# Patient Record
Sex: MALE | Race: WHITE | Employment: FULL TIME | URBAN - METROPOLITAN AREA
[De-identification: names, ages, dates, MRNs, and addresses within clinical notes are randomized per-mention and may not be internally consistent; named-entity substitution may affect disease eponyms.]

---

## 2022-07-07 ENCOUNTER — HOSPITAL ENCOUNTER (EMERGENCY)
Age: 62
Discharge: HOME | End: 2022-07-07
Attending: STUDENT IN AN ORGANIZED HEALTH CARE EDUCATION/TRAINING PROGRAM

## 2022-07-07 ENCOUNTER — APPOINTMENT (OUTPATIENT)
Dept: CT IMAGING | Age: 62
End: 2022-07-07

## 2022-07-07 VITALS
HEIGHT: 72 IN | BODY MASS INDEX: 32.51 KG/M2 | SYSTOLIC BLOOD PRESSURE: 148 MMHG | WEIGHT: 240 LBS | DIASTOLIC BLOOD PRESSURE: 90 MMHG | OXYGEN SATURATION: 99 % | RESPIRATION RATE: 16 BRPM | HEART RATE: 76 BPM | TEMPERATURE: 97.7 F

## 2022-07-07 DIAGNOSIS — S09.90XA CLOSED HEAD INJURY, INITIAL ENCOUNTER: ICD-10-CM

## 2022-07-07 DIAGNOSIS — S01.01XA LACERATION OF SCALP, INITIAL ENCOUNTER: Primary | ICD-10-CM

## 2022-07-07 PROCEDURE — 12002 RPR S/N/AX/GEN/TRNK2.6-7.5CM: CPT

## 2022-07-07 PROCEDURE — 99284 EMERGENCY DEPT VISIT MOD MDM: CPT

## 2022-07-07 PROCEDURE — 2500000003 HC RX 250 WO HCPCS: Performed by: NURSE PRACTITIONER

## 2022-07-07 PROCEDURE — 90471 IMMUNIZATION ADMIN: CPT | Performed by: NURSE PRACTITIONER

## 2022-07-07 PROCEDURE — 6360000002 HC RX W HCPCS: Performed by: NURSE PRACTITIONER

## 2022-07-07 PROCEDURE — 70450 CT HEAD/BRAIN W/O DYE: CPT

## 2022-07-07 PROCEDURE — 6370000000 HC RX 637 (ALT 250 FOR IP): Performed by: NURSE PRACTITIONER

## 2022-07-07 PROCEDURE — 90715 TDAP VACCINE 7 YRS/> IM: CPT | Performed by: NURSE PRACTITIONER

## 2022-07-07 RX ORDER — LIDOCAINE HYDROCHLORIDE AND EPINEPHRINE 10; 10 MG/ML; UG/ML
20 INJECTION, SOLUTION INFILTRATION; PERINEURAL ONCE
Status: COMPLETED | OUTPATIENT
Start: 2022-07-07 | End: 2022-07-07

## 2022-07-07 RX ADMIN — TETANUS TOXOID, REDUCED DIPHTHERIA TOXOID AND ACELLULAR PERTUSSIS VACCINE, ADSORBED 0.5 ML: 5; 2.5; 8; 8; 2.5 SUSPENSION INTRAMUSCULAR at 21:09

## 2022-07-07 RX ADMIN — Medication 3 ML: at 21:10

## 2022-07-07 RX ADMIN — LIDOCAINE HYDROCHLORIDE,EPINEPHRINE BITARTRATE 20 ML: 10; .01 INJECTION, SOLUTION INFILTRATION; PERINEURAL at 21:14

## 2022-07-07 ASSESSMENT — PAIN SCALES - GENERAL
PAINLEVEL_OUTOF10: 0
PAINLEVEL_OUTOF10: 3

## 2022-07-07 ASSESSMENT — ENCOUNTER SYMPTOMS
BACK PAIN: 0
COLOR CHANGE: 1

## 2022-07-07 ASSESSMENT — PAIN - FUNCTIONAL ASSESSMENT: PAIN_FUNCTIONAL_ASSESSMENT: 0-10

## 2022-07-07 ASSESSMENT — VISUAL ACUITY: OU: 1

## 2022-07-07 NOTE — Clinical Note
Torres Encarnacion was seen and treated in our emergency department on 7/7/2022. He may return to work on 07/09/2022. If you have any questions or concerns, please don't hesitate to call.       Efrain Ty, APRN - CNP

## 2022-07-08 NOTE — ED NOTES
Pt arrived to ED with a laceration to the top of his head. Pt states he was playing fetch with his dog when he turned and the dog took his legs out. Pt states he hit the mirror on his car. Pt doesn't know if he lost consciousness. Pt states he put a rag on the laceration and went to his sisters house to help him.       Germania West RN  07/07/22 2657

## 2022-07-08 NOTE — ED PROVIDER NOTES
Observation goals PRIOR TO DISCHARGE     Comments: -diagnostic tests and consults completed and resulted   -vital signs normal or at patient baseline: Goal Met  -tolerating oral intake to maintain hydration: Partially Met.   -adequate pain control on oral analgesics: Partially Met. Dilaudid takes care of pain right away but sill has pain with breathing.   -returns to baseline functional status: Goal Not Met. Painful with turning/repositioning. Hasn't been OOB yet.    -safe disposition plan has been identified: Goal Not Met. Unsure of plan. Home vs TCU.   Nurse to notify provider when observation goals have been met and patient is ready for discharge.           [facility] ED  EMERGENCY DEPARTMENT ENCOUNTER      Pt Name: Jackie Rodriguez  MRN: [id number]  [place] 1960  Date of evaluation: 7/7/2022  Provider: SAURAV Tierney CNP    CHIEF COMPLAINT       Chief Complaint   Patient presents with    Head Injury    Laceration         HISTORY OFPRESENT ILLNESS  (Location/Symptom, Timing/Onset, Context/Setting, Quality, Duration, Modifying Factors, Severity.)   Torres Encarnacion is a 62 y.o. male who presents to the emergency department by private auto for evaluation of head injury and scalp laceration. Patient was brought to the ER by his sister. Patient states he was mowing his lawn today and tripped over his dog and fell striking top of his head on the car door mirror. She fell to the ground. Unsure if he lost consciousness or hit his head on the ground. Has a mild headache upon arrival.  He has been drinking alcohol this evening. Unsure of his last tetanus. Nursing Notes were reviewed. PASTMEDICAL HISTORY   History reviewed. No pertinent past medical history. SURGICAL HISTORY     History reviewed. No pertinent surgical history. CURRENT MEDICATIONS     Previous Medications    ALPRAZOLAM (XANAX) 0.25 MG TABLET    Take 1 tablet by mouth 3 times daily as needed for Anxiety       ALLERGIES     Patient has no known allergies. FAMILY HISTORY     History reviewed. No pertinent family history. SOCIAL HISTORY       Social History     Socioeconomic History    Marital status: Single     Spouse name: None    Number of children: None    Years of education: None    Highest education level: None   Occupational History    None   Tobacco Use    Smoking status: Current Every Day Smoker    Smokeless tobacco: Never Used   Substance and Sexual Activity    Alcohol use:  Yes    Drug use: No    Sexual activity: None   Other Topics Concern    None   Social History Narrative    None     Social Determinants of Health     Financial Resource Strain: 97.7 °F (36.5 °C) Oral 76 16 99 % 6' (1.829 m) 240 lb (108.9 kg)       Physical Exam  Constitutional:       Appearance: Normal appearance. He is well-developed, well-groomed and normal weight. HENT:      Head: Normocephalic. Laceration present. Comments: 3 cm laceration noted to the top of the scalp. Minimal bleeding present. Right Ear: External ear normal.      Left Ear: External ear normal.      Nose: Nose normal.      Mouth/Throat:      Mouth: Mucous membranes are moist.   Eyes:      General: Lids are normal. Vision grossly intact. Extraocular Movements: Extraocular movements intact. Conjunctiva/sclera: Conjunctivae normal.      Pupils: Pupils are equal, round, and reactive to light. Pulmonary:      Effort: Pulmonary effort is normal. No respiratory distress. Musculoskeletal:         General: Normal range of motion. Cervical back: Full passive range of motion without pain, normal range of motion and neck supple. No pain with movement, spinous process tenderness or muscular tenderness. Skin:     General: Skin is warm and dry. Findings: Laceration present. Neurological:      Mental Status: He is alert and oriented to person, place, and time. GCS: GCS eye subscore is 4. GCS verbal subscore is 5. GCS motor subscore is 6. Cranial Nerves: Cranial nerves are intact. Sensory: Sensation is intact. Motor: Motor function is intact. Psychiatric:         Mood and Affect: Mood normal.         Behavior: Behavior normal.         Thought Content:  Thought content normal.         Judgment: Judgment normal.           DIAGNOSTIC RESULTS     EKG:All EKG's are interpreted by the Emergency Department Physician who either signs or Co-signs this chart in the absence of a cardiologist.        RADIOLOGY:   Non-plain film images such as CT, Ultrasound and MRI are read by theradiologist. Plain radiographic images are visualized and preliminarily interpreted by the emergency physician with the below findings:    CT HEAD WO CONTRAST    Result Date: 7/7/2022  EXAMINATION: CT OF THE HEAD WITHOUT CONTRAST  7/7/2022 8:58 pm TECHNIQUE: CT of the head was performed without the administration of intravenous contrast. Automated exposure control, iterative reconstruction, and/or weight based adjustment of the mA/kV was utilized to reduce the radiation dose to as low as reasonably achievable. COMPARISON: None. HISTORY: ORDERING SYSTEM PROVIDED HISTORY: Fall hit head on car mirror causing laceration to his scalp TECHNOLOGIST PROVIDED HISTORY: Fall hit head on car mirror causing laceration to his scalp Decision Support Exception - unselect if not a suspected or confirmed emergency medical condition->Emergency Medical Condition (MA) FINDINGS: BRAIN/VENTRICLES: There is no acute intracranial hemorrhage, mass effect or midline shift. No abnormal extra-axial fluid collection. The gray-white differentiation is maintained without evidence of an acute infarct. There is no evidence of hydrocephalus. ORBITS: The visualized portion of the orbits demonstrate no acute abnormality. SINUSES: The visualized paranasal sinuses and mastoid air cells demonstrate no acute abnormality. SOFT TISSUES/SKULL:  No acute abnormality of the visualized skull or soft tissues. No acute intracranial abnormality. No acute territorial infarction, intracranial hemorrhage or mass lesion. Interpretation per the Radiologist below, if available at the time of this note:    CT HEAD WO CONTRAST   Final Result   No acute intracranial abnormality. No acute territorial infarction,   intracranial hemorrhage or mass lesion. EDBEDSIDE ULTRASOUND:   Performed by Vinay Carvalho - none    LABS:  Labs Reviewed - No data to display    All other labs were within normal range or not returned as of this dictation. EMERGENCY DEPARTMENT COURSE andDIFFERENTIAL DIAGNOSIS/MDM:   Tetanus updated. Laceration repaired.   See procedure note.  CT head no acute acute abnormality. No neurological deficits. Patient discharged home with his sister. Instructed return to the ED in 6 days for suture removal.    Vitals:    Vitals:    07/07/22 2033 07/07/22 2034   BP:  (!) 148/90   Pulse: 76    Resp: 16    Temp:  97.7 °F (36.5 °C)   TempSrc:  Oral   SpO2: 99%    Weight: 240 lb (108.9 kg)    Height: 6' (1.829 m)          CONSULTS:  None    RES:  Lac Repair    Date/Time: 7/7/2022 10:02 PM  Performed by: SAURAV Serra - CNP  Authorized by: Karsten Boykin DO     Consent:     Consent obtained:  Verbal    Consent given by:  Patient    Risks discussed:  Infection, pain and poor cosmetic result  Anesthesia (see MAR for exact dosages): Anesthesia method:  Local infiltration    Local anesthetic:  Lidocaine 1% WITH epi  Laceration details:     Location:  Scalp    Scalp location:  Crown    Length (cm):  3  Repair type:     Repair type: Intermediate  Pre-procedure details:     Preparation:  Patient was prepped and draped in usual sterile fashion and imaging obtained to evaluate for foreign bodies  Exploration:     Wound exploration: wound explored through full range of motion and entire depth of wound probed and visualized      Wound extent: no nerve damage noted, no tendon damage noted and no underlying fracture noted      Contaminated: no    Treatment:     Area cleansed with:  Betadine    Irrigation solution:  Sterile saline    Irrigation method:  Pressure wash    Visualized foreign bodies/material removed: no    Skin repair:     Repair method:  Sutures    Suture size:  5-0    Suture material:  Nylon    Suture technique:  Simple interrupted    Number of sutures:  7  Approximation:     Approximation:  Close  Post-procedure details:     Dressing:  Open (no dressing)    Patient tolerance of procedure: Tolerated well, no immediate complications        FINAL IMPRESSION      1. Laceration of scalp, initial encounter    2.  Closed head injury, initial encounter DISPOSITION/PLAN   DISPOSITION Decision To Discharge 07/07/2022 10:15:12 PM      PATIENT REFERRED TO:   [facility] ED  [address]  [phone]    Return to the emergency department in 6 to 8 days for suture removal.      DISCHARGE MEDICATIONS:     New Prescriptions    No medications on file     Electronically signed by SAURAV Serra 7/7/2022 at 10:18 PM            SAURAV Serra CNP  07/07/22 3295

## 2022-07-15 ENCOUNTER — HOSPITAL ENCOUNTER (EMERGENCY)
Age: 62
Discharge: HOME | End: 2022-07-15
Attending: EMERGENCY MEDICINE

## 2022-07-15 VITALS
BODY MASS INDEX: 31.15 KG/M2 | DIASTOLIC BLOOD PRESSURE: 98 MMHG | RESPIRATION RATE: 16 BRPM | HEIGHT: 72 IN | HEART RATE: 92 BPM | WEIGHT: 230 LBS | SYSTOLIC BLOOD PRESSURE: 167 MMHG | TEMPERATURE: 98.2 F | OXYGEN SATURATION: 96 %

## 2022-07-15 DIAGNOSIS — Z48.02 VISIT FOR SUTURE REMOVAL: Primary | ICD-10-CM

## 2022-07-15 PROCEDURE — 99282 EMERGENCY DEPT VISIT SF MDM: CPT

## 2022-07-15 ASSESSMENT — ENCOUNTER SYMPTOMS
EYES NEGATIVE: 1
ALLERGIC/IMMUNOLOGIC NEGATIVE: 1
GASTROINTESTINAL NEGATIVE: 1
RESPIRATORY NEGATIVE: 1

## 2022-07-15 ASSESSMENT — PAIN - FUNCTIONAL ASSESSMENT: PAIN_FUNCTIONAL_ASSESSMENT: NONE - DENIES PAIN

## 2022-07-15 NOTE — ED PROVIDER NOTES
[facility]0 [facility] ED  [facility]      Pt Name: Lesa Green  MRN: [id number]  [place] 1960  Date ofevaluation: 7/15/2022  Provider: Jaqueline Moreland PA-C    CHIEF COMPLAINT       Chief Complaint   Patient presents with    Suture / Staple Removal         HISTORY OF PRESENT ILLNESS  (Location/Symptom, Timing/Onset, Context/Setting, Quality, Duration, Modifying Factors, Severity.)   Brendan Encarnacion is a 62 y.o. male who presents to the emergency department with removal to the forehead and scalp area. Patient denies any trouble or concerns. No redness, drainage, fevers, concerns for infection. Patient's only reason for being here today is to have the sutures removed. Nursing Notes were reviewed. ALLERGIES     Patient has no known allergies. CURRENT MEDICATIONS       Previous Medications    ALPRAZOLAM (XANAX) 0.25 MG TABLET    Take 1 tablet by mouth 3 times daily as needed for Anxiety       PAST MEDICAL HISTORY   History reviewed. No pertinent past medical history. SURGICAL HISTORY     History reviewed. No pertinent surgical history. FAMILY HISTORY     History reviewed. No pertinent family history. No family status information on file. SOCIAL HISTORY      reports that he has been smoking. He has never used smokeless tobacco. He reports current alcohol use. He reports that he does not use drugs. REVIEW OFSYSTEMS    (2-9 systems for level 4, 10 or more for level 5)   Review of Systems   Constitutional: Negative. Eyes: Negative. Respiratory: Negative. Cardiovascular: Negative. Gastrointestinal: Negative. Endocrine: Negative. Genitourinary: Negative. Musculoskeletal: Negative. Skin: Negative. Allergic/Immunologic: Negative. Neurological: Negative. Hematological: Negative. Psychiatric/Behavioral: Negative. All other systems reviewed and are negative.     Except as noted above the remainder of the review of systems was reviewed and negative. PHYSICAL EXAM    (up to 7 for level 4, 8 or more for level 5)     ED Triage Vitals [07/15/22 1823]   BP Temp Temp Source Heart Rate Resp SpO2 Height Weight   (!) 167/98 98.2 °F (36.8 °C) Oral 92 16 96 % 6' (1.829 m) 230 lb (104.3 kg)      Physical Exam  Constitutional:       Appearance: He is well-developed. HENT:      Head: Normocephalic and atraumatic. Cardiovascular:      Rate and Rhythm: Normal rate and regular rhythm. Pulmonary:      Effort: Pulmonary effort is normal.      Breath sounds: Normal breath sounds. Abdominal:      Palpations: Abdomen is soft. Musculoskeletal:         General: Normal range of motion. Cervical back: Normal range of motion and neck supple. Skin:     General: Skin is warm. Neurological:      Mental Status: He is alert and oriented to person, place, and time. Psychiatric:         Behavior: Behavior normal.               DIAGNOSTIC RESULTS     EKG: All EKG's are interpreted by the Emergency Department Physician who either signs or Co-signs this chart in the absence of a cardiologist.        RADIOLOGY:   Non-plain film images such as CT, Ultrasound and MRI are read by the radiologist. Plain radiographic images arevisualized and preliminarily interpreted by the emergency physician with the below findings:        Interpretation per the Radiologist below, if available at thetime of this note:          ED BEDSIDE ULTRASOUND:   Performed by ED Physician - none    LABS:  Labs Reviewed - No data to display    All other labs were within normal range or not returned as of this dictation. EMERGENCY DEPARTMENT COURSE and DIFFERENTIAL DIAGNOSIS/MDM:   Vitals:    Vitals:    07/15/22 1823   BP: (!) 167/98   Pulse: 92   Resp: 16   Temp: 98.2 °F (36.8 °C)   TempSrc: Oral   SpO2: 96%   Weight: 230 lb (104.3 kg)   Height: 6' (1.829 m)     Sutures removed without difficulty.       CONSULTS:  None    PROCEDURES:  Procedures        FINAL IMPRESSION 1. Visit for suture removal          DISPOSITION/PLAN   DISPOSITION Decision To Discharge 07/15/2022 06:37:44 PM      PATIENTREFERRED TO:   No follow-up provider specified.     DISCHARGE MEDICATIONS:     New Prescriptions    No medications on file           (Please note that portions of this note were completed with a voice recognition program.  Efforts were made to edit thedictations but occasionally words are mis-transcribed.)    STELLA Marshall PA-C  07/15/22 Kennedi Alfonso

## 2022-07-15 NOTE — ED NOTES
Pt here for suture removal.  Sutures are well approximated and no s/s of infection     Siri Mcgee RN  07/15/22 1822

## 2022-07-15 NOTE — DISCHARGE INSTRUCTIONS
Take meds as prescribed. Follow outpatient tomorrow with doctor or by calling [phone] or [phone].    Return to ER immediately if symptoms worsen or persist.

## 2022-07-15 NOTE — ED PROVIDER NOTES
eMERGENCY dEPARTMENT eNCOUnter   Independent Attestation     Pt Name: Obinna Dear  MRN: [id number]  [place] 1960  Date of evaluation: 7/15/22     Edsadaf Encarnacion is a 62 y.o. male with CC: Suture / Staple Removal        This visit was performed by both a physician and an APC. I performed all aspects of the MDM as documented.       The care is provided during an unprecedented national emergency due to the novel coronavirus, Edi Mathis MD  Attending Emergency Physician           Madina Tse MD  07/15/22 8823

## 2022-11-30 ENCOUNTER — HOSPITAL ENCOUNTER (EMERGENCY)
Age: 62
Discharge: HOME | End: 2022-11-30
Attending: EMERGENCY MEDICINE

## 2022-11-30 ENCOUNTER — APPOINTMENT (OUTPATIENT)
Dept: GENERAL RADIOLOGY | Age: 62
End: 2022-11-30

## 2022-11-30 VITALS
RESPIRATION RATE: 24 BRPM | HEIGHT: 72 IN | DIASTOLIC BLOOD PRESSURE: 73 MMHG | BODY MASS INDEX: 32.51 KG/M2 | SYSTOLIC BLOOD PRESSURE: 138 MMHG | OXYGEN SATURATION: 96 % | HEART RATE: 103 BPM | WEIGHT: 240 LBS | TEMPERATURE: 98.3 F

## 2022-11-30 DIAGNOSIS — J11.1 INFLUENZA WITH RESPIRATORY MANIFESTATION OTHER THAN PNEUMONIA: Primary | ICD-10-CM

## 2022-11-30 LAB
INFLUENZA A: DETECTED
INFLUENZA B: NOT DETECTED
SARS-COV-2 RNA, RT PCR: NOT DETECTED
SOURCE: ABNORMAL
SPECIMEN DESCRIPTION: ABNORMAL

## 2022-11-30 PROCEDURE — 99284 EMERGENCY DEPT VISIT MOD MDM: CPT

## 2022-11-30 PROCEDURE — 6370000000 HC RX 637 (ALT 250 FOR IP): Performed by: STUDENT IN AN ORGANIZED HEALTH CARE EDUCATION/TRAINING PROGRAM

## 2022-11-30 PROCEDURE — 71045 X-RAY EXAM CHEST 1 VIEW: CPT

## 2022-11-30 PROCEDURE — 87636 SARSCOV2 & INF A&B AMP PRB: CPT

## 2022-11-30 RX ORDER — PREDNISONE 50 MG/1
50 TABLET ORAL DAILY
Qty: 4 TABLET | Refills: 0 | Status: SHIPPED | OUTPATIENT
Start: 2022-11-30 | End: 2022-12-04

## 2022-11-30 RX ORDER — ACETAMINOPHEN 500 MG
1000 TABLET ORAL ONCE
Status: COMPLETED | OUTPATIENT
Start: 2022-11-30 | End: 2022-11-30

## 2022-11-30 RX ORDER — OSELTAMIVIR PHOSPHATE 75 MG/1
75 CAPSULE ORAL 2 TIMES DAILY
Qty: 10 CAPSULE | Refills: 0 | Status: SHIPPED | OUTPATIENT
Start: 2022-11-30 | End: 2022-12-05

## 2022-11-30 RX ORDER — OSELTAMIVIR PHOSPHATE 75 MG/1
75 CAPSULE ORAL ONCE
Status: COMPLETED | OUTPATIENT
Start: 2022-11-30 | End: 2022-11-30

## 2022-11-30 RX ORDER — ALBUTEROL SULFATE 90 UG/1
2 AEROSOL, METERED RESPIRATORY (INHALATION) ONCE
Status: COMPLETED | OUTPATIENT
Start: 2022-11-30 | End: 2022-11-30

## 2022-11-30 RX ADMIN — PREDNISONE 50 MG: 20 TABLET ORAL at 22:46

## 2022-11-30 RX ADMIN — ACETAMINOPHEN 1000 MG: 500 TABLET ORAL at 22:45

## 2022-11-30 RX ADMIN — ALBUTEROL SULFATE 2 PUFF: 90 AEROSOL, METERED RESPIRATORY (INHALATION) at 22:47

## 2022-11-30 RX ADMIN — OSELTAMIVIR 75 MG: 75 CAPSULE ORAL at 22:45

## 2022-11-30 ASSESSMENT — ENCOUNTER SYMPTOMS
SINUS PRESSURE: 0
CONSTIPATION: 0
EYE PAIN: 0
VOMITING: 0
ABDOMINAL PAIN: 0
RHINORRHEA: 1
DIARRHEA: 0
COLOR CHANGE: 0
NAUSEA: 0
PHOTOPHOBIA: 0
CHEST TIGHTNESS: 0
EYE REDNESS: 0
SORE THROAT: 0
COUGH: 1
SHORTNESS OF BREATH: 1

## 2022-11-30 ASSESSMENT — PAIN - FUNCTIONAL ASSESSMENT: PAIN_FUNCTIONAL_ASSESSMENT: NONE - DENIES PAIN

## 2022-11-30 ASSESSMENT — LIFESTYLE VARIABLES
HOW OFTEN DO YOU HAVE A DRINK CONTAINING ALCOHOL: MONTHLY OR LESS
HOW MANY STANDARD DRINKS CONTAINING ALCOHOL DO YOU HAVE ON A TYPICAL DAY: 1 OR 2

## 2022-11-30 NOTE — Clinical Note
Weston Alcantara was seen and treated in our emergency department on 11/30/2022. He may return to work on 12/02/2022. If you have any questions or concerns, please don't hesitate to call.       Karen Kimble MD

## 2022-12-01 NOTE — ED PROVIDER NOTES
[address] ED  eMERGENCY dEPARTMENT eNCOUnter   Attending Attestation     Pt Name: Jennifer Schmitz  MRN: [id number]  [place] 1960  Date of evaluation: 11/30/22    History, EXAM, MDM:    Jennifer Schmitz is a 65 y.o. male who presents with Shortness of Breath (Pt to ED for progressive SOB since Sunday without CP/Pt states \"something isn't right\" /Increased WOB observed/Pt denies any pertinent medical hx or sick contacts//Pt states he started a new job about a month ago which has led him to be a little more sedentary)  Cough, congestion, sob since Sunday. Former smoker. No CP. No leg edema. On exam, normotensive, tachycardia on presentation. Had some bilateral exp wheezing on exam.  No calf ttp. CXR shows no pna, no sign of chf. Influenza A test is positive. Pt started on tamiflu and prednisone. Recommended close follow up with pcp, return to ED if symptoms worsen, and warning precautions provided. Vitals:   Vitals:    11/30/22 2110   BP: 138/73   Pulse: (!) 103   Resp: 24   Temp: 98.3 °F (36.8 °C)   TempSrc: Oral   SpO2: 96%   Weight: 240 lb (108.9 kg)   Height: 6' (1.829 m)     I performed a history and physical examination of the patient and discussed management with the resident. I reviewed the residents note and agree with the documented findings and plan of care. Any areas of disagreement are noted on the chart. I was personally present for the key portions of any procedures. I have documented in the chart those procedures where I was not present during the key portions. I have personally reviewed all images and agree with the resident's interpretation. I have reviewed the emergency nurses triage note. I agree with the chief complaint, past medical history, past surgical history, allergies, medications, social and family history as documented unless otherwise noted below.  Documentation of the HPI, Physical Exam and Medical Decision Making performed by medical students or scribes is based on my personal performance of the HPI, PE and MDM. For Phys Assistant/ Nurse Practitioner cases/documentation I have had a face to face evaluation of this patient and have completed at least one if not all key elements of the E/M (history, physical exam, and MDM). Additional findings are as noted.     Tamia Ignacio MD  Attending Emergency  Physician                Tamia Ignacio MD  12/01/22 0334       Tamia Ignacio MD  12/01/22 0137

## 2022-12-01 NOTE — ED PROVIDER NOTES
[address]  Emergency Department Encounter  EmergencyMedicine Resident     Pt Name:Brendan Mosher  MRN: [id number]  Birthdate 1960  Date of evaluation: 11/30/22  PCP:  No primary care provider on file. CHIEF COMPLAINT       Chief Complaint   Patient presents with    Shortness of Breath     Pt to ED for progressive SOB since Sunday without CP  Pt states \"something isn't right\"   Increased WOB observed  Pt denies any pertinent medical hx or sick contacts    Pt states he started a new job about a month ago which has led him to be a little more sedentary       HISTORY OF PRESENT ILLNESS  (Location/Symptom, Timing/Onset, Context/Setting, Quality, Duration, Modifying Factors, Severity.)      Brendan Encarnacion is a 65 y.o. male who presents with dry cough, nasal congestion and runny nose, myalgias and chills since Sunday. Was well prior without issues with SOB. No orthopnea. No medical history, takes no medications. States he feels like he has something to cough up but nothing produces. Clear nasal drainage. Subjective fevers. No chest pain or nausea or vomiting or diaphoresis. PAST MEDICAL / SURGICAL / SOCIAL / FAMILY HISTORY      has no past medical history on file. reviewed with patient and none reported. has no past surgical history on file. reviewed with patient and none reported.      Social History     Socioeconomic History    Marital status: Single     Spouse name: Not on file    Number of children: Not on file    Years of education: Not on file    Highest education level: Not on file   Occupational History    Not on file   Tobacco Use    Smoking status: Every Day    Smokeless tobacco: Never   Substance and Sexual Activity    Alcohol use: Yes    Drug use: No    Sexual activity: Not on file   Other Topics Concern    Not on file   Social History Narrative    Not on file     Social Determinants of Health     Financial Resource Strain: Not on file   Food Insecurity: Not on file Transportation Needs: Not on file   Physical Activity: Not on file   Stress: Not on file   Social Connections: Not on file   Intimate Partner Violence: Not on file   Housing Stability: Not on file       No family history on file. Allergies:  Patient has no known allergies. Home Medications:  Prior to Admission medications    Medication Sig Start Date End Date Taking? Authorizing Provider   oseltamivir (TAMIFLU) 75 MG capsule Take 1 capsule by mouth 2 times daily for 5 days 11/30/22 12/5/22 Yes Storm Castañeda MD   predniSONE (DELTASONE) 50 MG tablet Take 1 tablet by mouth daily for 4 days 11/30/22 12/4/22 Yes Storm Castañeda MD   ALPRAZolam [facility]) 0.25 MG tablet Take 1 tablet by mouth 3 times daily as needed for Anxiety 8/11/15   Rudy Moore MD       REVIEW OF SYSTEMS    (2-9 systems for level 4, 10 or more for level 5)      Review of Systems   Constitutional:  Positive for chills. Negative for activity change, diaphoresis, fatigue and fever. HENT:  Positive for congestion and rhinorrhea. Negative for sinus pressure and sore throat. Eyes:  Negative for photophobia, pain and redness. Respiratory:  Positive for cough and shortness of breath. Negative for chest tightness. Cardiovascular:  Negative for chest pain and leg swelling. Gastrointestinal:  Negative for abdominal pain, constipation, diarrhea, nausea and vomiting. Genitourinary:  Negative for dysuria, flank pain, frequency and urgency. Musculoskeletal:  Negative for arthralgias, myalgias and neck stiffness. Skin:  Negative for color change, pallor and rash. Neurological:  Negative for dizziness, syncope, weakness, light-headedness, numbness and headaches.        PHYSICAL EXAM   (up to 7 for level 4, 8 or more for level 5)      INITIAL VITALS:   /73   Pulse (!) 103   Temp 98.3 °F (36.8 °C) (Oral)   Resp 24   Ht 6' (1.829 m)   Wt 240 lb (108.9 kg)   SpO2 96%   BMI 32.55 kg/m²     Physical Exam  Constitutional:  Well developed, Well nourished. HENT:  Normocephalic, Atraumatic, Bilateral external ears normal,  Nose normal.   Neck: Normal range of motion, No stridor. Eyes:   No discharge. Respiratory:   No respiratory distress, bilateral wheezing more on the right, left lower, rales or rhonchi. Cardiovascular: Normal S1, S2. No rubs, gallops or murmurs. Gastrointestinal:  No organomegaly, no tenderness, no rebound or guarding. Musculoskeletal:  No extremity deformity. Lymphatic: No lymphadenopathy noted  Skin:  Warm, Dry,  No rash. Neurologic:  Alert & oriented x 3, Normal motor function,  No focal deficits noted. Psychiatric:  Affect normal, Judgment normal, Mood normal.              DIFFERENTIAL  DIAGNOSIS     PLAN (LABS / IMAGING / EKG):  Orders Placed This Encounter   Procedures    COVID-19 & Influenza Combo    XR CHEST PORTABLE       MEDICATIONS ORDERED:  Orders Placed This Encounter   Medications    acetaminophen (TYLENOL) tablet 1,000 mg    albuterol sulfate HFA (PROVENTIL;VENTOLIN;PROAIR) 108 (90 Base) MCG/ACT inhaler 2 puff     Order Specific Question:   Initiate RT Bronchodilator Protocol     Answer:   Yes - ED protocol    oseltamivir (TAMIFLU) capsule 75 mg    oseltamivir (TAMIFLU) 75 MG capsule     Sig: Take 1 capsule by mouth 2 times daily for 5 days     Dispense:  10 capsule     Refill:  0    predniSONE (DELTASONE) tablet 50 mg    predniSONE (DELTASONE) 50 MG tablet     Sig: Take 1 tablet by mouth daily for 4 days     Dispense:  4 tablet     Refill:  0       DDX: Influenza, COVID    DIAGNOSTIC RESULTS / EMERGENCY DEPARTMENT COURSE / MDM   LAB RESULTS:  Results for orders placed or performed during the hospital encounter of 11/30/22   COVID-19 & Influenza Combo    Specimen: Nasopharyngeal Swab   Result Value Ref Range    Specimen Description . NASOPHARYNGEAL SWAB     Source . NASOPHARYNGEAL SWAB     SARS-CoV-2 RNA, RT PCR Not Detected Not Detected    INFLUENZA A DETECTED (A) Not Detected    INFLUENZA B Not Detected Not Detected         RADIOLOGY:  XR CHEST PORTABLE   Final Result   No acute process. IMPRESSION: 80-year-old male with no past medical history former smoker presenting with runny nose, cough and congestion, myalgias subjective fevers been ongoing since Sunday. Otherwise well prior to that with no orthopnea. No concern for worsening chronic issue. This is likely a viral illness. Will obtain flu swab, COVID treat symptomatically with Tylenol and albuterol. No concern for ACS as he has no chest pain    EMERGENCY DEPARTMENT COURSE:  ED Course as of 11/30/22 2233 Wed Nov 30, 2022 2149 We will give a dose of prednisone and Tamiflu. [QC]   0501 INFLUENZA A(!): DETECTED [QC]   8716 Chest x-ray negative [QC]      ED Course User Index  [QC] Gege Washington MD               PROCEDURES:      CONSULTS:  None        FINAL IMPRESSION      1.  Influenza with respiratory manifestation other than pneumonia          DISPOSITION / PLAN     DISPOSITION Discharge - Pending Orders Complete 11/30/2022 10:28:01 PM      PATIENT REFERRED TO:  [address] [id number] [phone]  Schedule an appointment as soon as possible for a visit   To [facility] ED  [facility] [address] [phone]    If symptoms worsen    DISCHARGE MEDICATIONS:  New Prescriptions    OSELTAMIVIR (TAMIFLU) 75 MG CAPSULE    Take 1 capsule by mouth 2 times daily for 5 days    PREDNISONE (DELTASONE) 50 MG TABLET    Take 1 tablet by mouth daily for 4 days       Gege Washington MD  Emergency Medicine Resident PGY2    (Please note that portions of thisnote were completed with a voice recognition program.  Efforts were made to edit the dictations but occasionally words are mis-transcribed.)         Gege Washington MD  Resident  11/30/22 6040

## 2022-12-01 NOTE — DISCHARGE INSTRUCTIONS
And in the emergency department and found to have the flu. You are given a dose of Tamiflu and a steroid which can help shorten the duration of your symptoms. Also continue taking ibuprofen or Aleve that you have at home, 1 tablet every 6-8 hours. Return to emergency department if you develop worsening shortness of breath, fatigue lightheadedness or dizziness persistent fever, nausea or vomiting have difficulty walking. You are provided with a phone number for [facility] to establish care with a family doctor.